# Patient Record
Sex: FEMALE | Race: WHITE | Employment: FULL TIME | URBAN - METROPOLITAN AREA
[De-identification: names, ages, dates, MRNs, and addresses within clinical notes are randomized per-mention and may not be internally consistent; named-entity substitution may affect disease eponyms.]

---

## 2019-01-27 ENCOUNTER — HOSPITAL ENCOUNTER (EMERGENCY)
Age: 39
Discharge: HOME | End: 2019-01-27
Attending: EMERGENCY MEDICINE
Payer: COMMERCIAL

## 2019-01-27 VITALS
HEIGHT: 65 IN | HEART RATE: 110 BPM | OXYGEN SATURATION: 100 % | SYSTOLIC BLOOD PRESSURE: 146 MMHG | WEIGHT: 150 LBS | TEMPERATURE: 98.1 F | RESPIRATION RATE: 20 BRPM | BODY MASS INDEX: 24.99 KG/M2 | DIASTOLIC BLOOD PRESSURE: 89 MMHG

## 2019-01-27 DIAGNOSIS — S61.211A LACERATION OF LEFT INDEX FINGER WITHOUT FOREIGN BODY WITHOUT DAMAGE TO NAIL, INITIAL ENCOUNTER: Primary | ICD-10-CM

## 2019-01-27 PROCEDURE — 99283 EMERGENCY DEPT VISIT LOW MDM: CPT

## 2019-01-27 NOTE — ED PROVIDER NOTES
EMERGENCY DEPARTMENT HISTORY AND PHYSICAL EXAM 
 
Date: 1/27/2019 Patient Name: Eyad Silvestre History of Presenting Illness Chief Complaint Patient presents with  Laceration History Provided By: Patient Chief Complaint: Laceration Duration: 1.5 Hours Timing:  Acute Location: Left index finger Severity: 4 out of 10 Associated Symptoms:  left index finger pain, bleeding from left index finger Additional History (Context):  
6:10 PM 
Eyad Silvestre is a 41 y.o. female with PMHX asthma presents to the emergency department C/O acute laceration to the left index finger (4/10) that won't stop bleeding onset 1.5 hours ago. Associated sxs include left index finger pain, bleeding from left index finger. Pt reports she had just begun to cut chicken with a clean knife when she accidentally sliced her left index finger. Tetanus UTD. NKDA. Pt denies use of blood thinners, fever, and any other sxs or complaints. PCP: [place], MD Nory 
 
Current Outpatient Medications Medication Sig Dispense Refill  fluticasone-salmeterol (ADVAIR DISKUS) 100-50 mcg/dose diskus inhaler Take 1 puff by inhalation every twelve (12) hours.  budesonide-formoterol (SYMBICORT) 160-4.5 mcg/actuation HFA inhaler Take 2 Puffs by inhalation two (2) times a day.  OTHER Allergy shots Past History Past Medical History: 
Past Medical History:  
Diagnosis Date  Asthma  Ill-defined condition   
 disc Past Surgical History: 
Past Surgical History:  
Procedure Laterality Date  HX LEEP PROCEDURE Family History: 
History reviewed. No pertinent family history. Social History: 
Social History Tobacco Use  Smoking status: Never Smoker  Smokeless tobacco: Never Used Substance Use Topics  Alcohol use: No  
 Drug use: No  
 
 
Allergies: 
No Known Allergies Review of Systems Review of Systems Constitutional: Negative for fever. Musculoskeletal: (+) Left index finger pain Skin: Positive for wound (laceration to left index finger). (+) Bleeding from left index finger All other systems reviewed and are negative. Physical Exam  
 
Vitals:  
 01/27/19 1800 BP: 146/89 Pulse: (!) 110 Resp: 20 Temp: 98.1 °F (36.7 °C) SpO2: 100% Weight: 68 kg (150 lb) Height: 5' 5\" (1.651 m) Physical Exam  
Constitutional: She is oriented to person, place, and time. She appears well-developed and well-nourished. No distress. HENT:  
Head: Normocephalic and atraumatic. Eyes: Conjunctivae and EOM are normal. Pupils are equal, round, and reactive to light. Neck: Normal range of motion. Neck supple. Musculoskeletal: Normal range of motion. oeft index finger with small superficial linear lac lateral nailbed, bleeding controlled, no fb or ttp, from Neurological: She is alert and oriented to person, place, and time. Skin: Skin is warm and dry. Psychiatric: She has a normal mood and affect. Her behavior is normal.  
Nursing note and vitals reviewed. Diagnostic Study Results Labs: 
 No results found for this or any previous visit (from the past 12 hour(s)). Radiologic Studies: No orders to display CT Results  (Last 48 hours) None CXR Results  (Last 48 hours) None Medical Decision Making I am the first provider for this patient. I reviewed the vital signs, available nursing notes, past medical history, past surgical history, family history and social history. Vital Signs: Reviewed the patient's vital signs. Pulse Oximetry Analysis: 100% on RA Records Reviewed: Nursing Notes and Old Medical Records Procedures: 
Procedures ED Course:  
6:10 PM Initial assessment performed. The patients presenting problems have been discussed, and they are in agreement with the care plan formulated and outlined with them.   I have encouraged them to ask questions as they arise throughout their visit. Discussion: Pt presents with small simple finger laceration left index finger. Wound is superficial & small & does NOT require repair. Wound was cleansed & dressed. Strict return precautions given, wound cleaning discussed, and  pt is offering no questions or concerns at this time. Diagnosis and Disposition DISCHARGE NOTE: 
6:33 PM  
Karmen Powell's  results have been reviewed with her. She has been counseled regarding her diagnosis, treatment, and plan. She verbally conveys understanding and agreement of the signs, symptoms, diagnosis, treatment and prognosis and additionally agrees to follow up as discussed. She also agrees with the care-plan and conveys that all of her questions have been answered. I have also provided discharge instructions for her that include: educational information regarding their diagnosis and treatment, and list of reasons why they would want to return to the ED prior to their follow-up appointment, should her condition change. She has been provided with education for proper emergency department utilization. CLINICAL IMPRESSION: 
 
1. Laceration of left index finger without foreign body without damage to nail, initial encounter PLAN: 
1. D/C Home 2. Current Discharge Medication List  
  
 
3. Follow-up Information Follow up With Specialties Details Why Contact Info  
 your doctor  Schedule an appointment as soon as possible for a visit [facility] EMERGENCY DEPT Emergency Medicine  As needed, If symptoms worsen [address] [place] [address] 
[phone] [facility]    [address], [address] [place] [address] 
[phone]  
  
 
___________________________ Attestations:  
 
SCRIBE ATTESTATION: 
This note is prepared by Tadeo Francois, acting as Scribe for Serge Denis PA-C. 
 
PROVIDER ATTESTATION: 
 Fede Hope PA-C: The scribe's documentation has been prepared under my direction and personally reviewed by me in its entirety. I confirm that the note above accurately reflects all work, treatment, procedures, and medical decision making performed by me.  
___________________________

## 2019-01-27 NOTE — DISCHARGE INSTRUCTIONS
Patient Education        Cuts Left Open: Care Instructions  Your Care Instructions    A cut can happen anywhere on your body. Sometimes a cut can injure the tendons, blood vessels, or nerves. A cut may be left open instead of being closed with stitches, staples, or adhesive. A cut may be left open when it is likely to become infected, because closing it can make infection even more likely. You will probably have a bandage. The doctor may want the cut to stay open the whole time it heals. This happens with some cuts when too much time has gone by since the cut happened. Or the doctor may tell you to come back to have the cut closed in 4 to 5 days, when there is less chance of infection. If the cut stays open while healing, your scar may be larger than if the cut was closed. But you can get treatment later to make the scar smaller. The doctor has checked you carefully, but problems can develop later. If you notice any problems or new symptoms, get medical treatment right away. Follow-up care is a key part of your treatment and safety. Be sure to make and go to all appointments, and call your doctor if you are having problems. It's also a good idea to know your test results and keep a list of the medicines you take. How can you care for yourself at home? · Keep the cut dry for the first 24 to 48 hours. After this, you can shower if your doctor okays it. Pat the cut dry. · Don't soak the cut, such as in a bathtub. Your doctor will tell you when it's safe to get the cut wet. · If your doctor told you how to care for your cut, follow your doctor's instructions. If you did not get instructions, follow this general advice:  ? After the first 24 to 48 hours, wash the cut with clean water 2 times a day. Don't use hydrogen peroxide or alcohol, which can slow healing. ? You may cover the cut with a thin layer of petroleum jelly, such as Vaseline, and a nonstick bandage. ?  Apply more petroleum jelly and replace the bandage as needed. · Prop up the injured area on a pillow anytime you sit or lie down during the next 3 days. Try to keep it above the level of your heart. This will help reduce swelling. · Avoid any activity that could cause your cut to get worse. · Take pain medicines exactly as directed. ? If the doctor gave you a prescription medicine for pain, take it as prescribed. ? If you are not taking a prescription pain medicine, ask your doctor if you can take an over-the-counter medicine. When should you call for help? Call your doctor now or seek immediate medical care if:    · You have new pain, or your pain gets worse.     · The cut starts to bleed, and blood soaks through the bandage. Oozing small amounts of blood is normal.     · The skin near the cut is cold or pale or changes color.     · You have tingling, weakness, or numbness near the cut.     · You have trouble moving the area near the cut.     · You have symptoms of infection, such as:  ? Increased pain, swelling, warmth, or redness around the cut.  ? Red streaks leading from the cut.  ? Pus draining from the cut.  ? A fever.    Watch closely for changes in your health, and be sure to contact your doctor if:    · The cut is not closing (getting smaller).     · You do not get better as expected. Where can you learn more? Go to http://kody-edgardo.info/. Enter 20-23-41-52 in the search box to learn more about \"Cuts Left Open: Care Instructions. \"  Current as of: September 23, 2018  Content Version: 11.9  © 2324-5063 [employer]. Care instructions adapted under license by [employer] (which disclaims liability or warranty for this information). If you have questions about a medical condition or this instruction, always ask your healthcare professional. [place] [address] any warranty or liability for your use of this information.

## 2020-01-31 ENCOUNTER — HOSPITAL ENCOUNTER (EMERGENCY)
Age: 40
Discharge: HOME | End: 2020-02-01
Attending: EMERGENCY MEDICINE
Payer: COMMERCIAL

## 2020-01-31 DIAGNOSIS — G43.909 MIGRAINE WITHOUT STATUS MIGRAINOSUS, NOT INTRACTABLE, UNSPECIFIED MIGRAINE TYPE: Primary | ICD-10-CM

## 2020-01-31 DIAGNOSIS — J01.90 ACUTE SINUSITIS, RECURRENCE NOT SPECIFIED, UNSPECIFIED LOCATION: ICD-10-CM

## 2020-01-31 PROCEDURE — 96374 THER/PROPH/DIAG INJ IV PUSH: CPT

## 2020-01-31 PROCEDURE — 99283 EMERGENCY DEPT VISIT LOW MDM: CPT

## 2020-01-31 PROCEDURE — 74011250636 HC RX REV CODE- 250/636: Performed by: PHYSICIAN ASSISTANT

## 2020-01-31 RX ORDER — KETOROLAC TROMETHAMINE 15 MG/ML
15 INJECTION, SOLUTION INTRAMUSCULAR; INTRAVENOUS
Status: COMPLETED | OUTPATIENT
Start: 2020-01-31 | End: 2020-02-01

## 2020-01-31 RX ORDER — ONDANSETRON 2 MG/ML
4 INJECTION INTRAMUSCULAR; INTRAVENOUS
Status: COMPLETED | OUTPATIENT
Start: 2020-01-31 | End: 2020-01-31

## 2020-01-31 RX ADMIN — ONDANSETRON 4 MG: 2 INJECTION INTRAMUSCULAR; INTRAVENOUS at 23:59

## 2020-02-01 VITALS
SYSTOLIC BLOOD PRESSURE: 103 MMHG | BODY MASS INDEX: 27.49 KG/M2 | RESPIRATION RATE: 16 BRPM | HEART RATE: 87 BPM | TEMPERATURE: 97.6 F | OXYGEN SATURATION: 100 % | WEIGHT: 165 LBS | DIASTOLIC BLOOD PRESSURE: 63 MMHG | HEIGHT: 65 IN

## 2020-02-01 LAB
ANION GAP SERPL CALC-SCNC: 8 MMOL/L (ref 3–18)
BASOPHILS # BLD: 0 K/UL (ref 0–0.1)
BASOPHILS NFR BLD: 0 % (ref 0–2)
BUN SERPL-MCNC: 13 MG/DL (ref 7–18)
BUN/CREAT SERPL: 17 (ref 12–20)
CALCIUM SERPL-MCNC: 9.2 MG/DL (ref 8.5–10.1)
CHLORIDE SERPL-SCNC: 107 MMOL/L (ref 100–111)
CO2 SERPL-SCNC: 25 MMOL/L (ref 21–32)
CREAT SERPL-MCNC: 0.77 MG/DL (ref 0.6–1.3)
DIFFERENTIAL METHOD BLD: ABNORMAL
EOSINOPHIL # BLD: 0.1 K/UL (ref 0–0.4)
EOSINOPHIL NFR BLD: 1 % (ref 0–5)
ERYTHROCYTE [DISTWIDTH] IN BLOOD BY AUTOMATED COUNT: 12 % (ref 11.6–14.5)
GLUCOSE SERPL-MCNC: 135 MG/DL (ref 74–99)
HCG SERPL QL: NEGATIVE
HCT VFR BLD AUTO: 37.2 % (ref 35–45)
HGB BLD-MCNC: 12.6 G/DL (ref 12–16)
LYMPHOCYTES # BLD: 2 K/UL (ref 0.9–3.6)
LYMPHOCYTES NFR BLD: 20 % (ref 21–52)
MCH RBC QN AUTO: 29.6 PG (ref 24–34)
MCHC RBC AUTO-ENTMCNC: 33.9 G/DL (ref 31–37)
MCV RBC AUTO: 87.3 FL (ref 74–97)
MONOCYTES # BLD: 0.5 K/UL (ref 0.05–1.2)
MONOCYTES NFR BLD: 5 % (ref 3–10)
NEUTS SEG # BLD: 7.3 K/UL (ref 1.8–8)
NEUTS SEG NFR BLD: 74 % (ref 40–73)
PLATELET # BLD AUTO: 302 K/UL (ref 135–420)
PMV BLD AUTO: 9 FL (ref 9.2–11.8)
POTASSIUM SERPL-SCNC: 3.9 MMOL/L (ref 3.5–5.5)
RBC # BLD AUTO: 4.26 M/UL (ref 4.2–5.3)
SODIUM SERPL-SCNC: 140 MMOL/L (ref 136–145)
WBC # BLD AUTO: 9.8 K/UL (ref 4.6–13.2)

## 2020-02-01 PROCEDURE — 85025 COMPLETE CBC W/AUTO DIFF WBC: CPT

## 2020-02-01 PROCEDURE — 96375 TX/PRO/DX INJ NEW DRUG ADDON: CPT

## 2020-02-01 PROCEDURE — 74011250636 HC RX REV CODE- 250/636: Performed by: PHYSICIAN ASSISTANT

## 2020-02-01 PROCEDURE — 74011000258 HC RX REV CODE- 258: Performed by: PHYSICIAN ASSISTANT

## 2020-02-01 PROCEDURE — 96361 HYDRATE IV INFUSION ADD-ON: CPT

## 2020-02-01 PROCEDURE — 84703 CHORIONIC GONADOTROPIN ASSAY: CPT

## 2020-02-01 PROCEDURE — 80048 BASIC METABOLIC PNL TOTAL CA: CPT

## 2020-02-01 RX ORDER — TRIAMCINOLONE ACETONIDE 55 UG/1
2 SPRAY, METERED NASAL
COMMUNITY

## 2020-02-01 RX ORDER — AMOXICILLIN AND CLAVULANATE POTASSIUM 875; 125 MG/1; MG/1
TABLET, FILM COATED ORAL
COMMUNITY
Start: 2020-01-30

## 2020-02-01 RX ORDER — PREDNISONE 10 MG/1
TABLET ORAL
COMMUNITY
Start: 2019-12-20

## 2020-02-01 RX ORDER — MONTELUKAST SODIUM 10 MG/1
10 TABLET ORAL
COMMUNITY
Start: 2016-12-20

## 2020-02-01 RX ORDER — TOPIRAMATE 25 MG/1
25 CAPSULE, COATED PELLETS ORAL
COMMUNITY

## 2020-02-01 RX ORDER — BUDESONIDE AND FORMOTEROL FUMARATE DIHYDRATE 80; 4.5 UG/1; UG/1
2 AEROSOL RESPIRATORY (INHALATION)
COMMUNITY

## 2020-02-01 RX ORDER — ALBUTEROL SULFATE 0.83 MG/ML
2.5 SOLUTION RESPIRATORY (INHALATION)
COMMUNITY
Start: 2019-12-20

## 2020-02-01 RX ORDER — ONDANSETRON 4 MG/1
4 TABLET, ORALLY DISINTEGRATING ORAL
COMMUNITY
Start: 2019-12-03

## 2020-02-01 RX ORDER — LEVOCETIRIZINE DIHYDROCHLORIDE 5 MG/1
5 TABLET, FILM COATED ORAL
COMMUNITY

## 2020-02-01 RX ORDER — IBUPROFEN 200 MG
600 TABLET ORAL
COMMUNITY

## 2020-02-01 RX ORDER — ALBUTEROL SULFATE 90 UG/1
AEROSOL, METERED RESPIRATORY (INHALATION)
COMMUNITY
Start: 2020-01-30

## 2020-02-01 RX ADMIN — SODIUM CHLORIDE 1000 ML: 900 INJECTION, SOLUTION INTRAVENOUS at 00:01

## 2020-02-01 RX ADMIN — SODIUM CHLORIDE 500 ML: 900 INJECTION, SOLUTION INTRAVENOUS at 01:08

## 2020-02-01 RX ADMIN — PROMETHAZINE HYDROCHLORIDE 12.5 MG: 25 INJECTION, SOLUTION INTRAMUSCULAR; INTRAVENOUS at 01:25

## 2020-02-01 RX ADMIN — KETOROLAC TROMETHAMINE 15 MG: 15 INJECTION, SOLUTION INTRAMUSCULAR; INTRAVENOUS at 00:00

## 2020-02-01 NOTE — ED PROVIDER NOTES
EMERGENCY DEPARTMENT HISTORY AND PHYSICAL EXAM    Date: 1/31/2020  Patient Name: Soraya Zaragoza    History of Presenting Illness     Chief Complaint   Patient presents with    Migraine         History Provided By: Patient    11:29 PM  Soraya Zaragoza is a 42 y.o. female with PMHX of migraines and asthma who presents to the emergency department C/O left-sided headache, nausea vomiting onset today. Patient reports 1 week of nasal congestion sore throat [facility] which [facility], had no testing and was prescribed Augmentin for sinusitis. Patient is also being worked up for various musculoskeletal complaints to include work-up for autoimmune disorder as well as infertility. Earlier this month she was placed on Topamax and states migraines have become less frequent. Her typical migraine consists of headache and nausea and vomiting and light sensitivity. Pt denies fever, persistent sore throat, ear pain, diarrhea, abdominal pain, and any other sxs or complaints. PCP: Nory Reid MD    Current Facility-Administered Medications   Medication Dose Route Frequency Provider Last Rate Last Dose    sodium chloride 0.9 % bolus infusion 500 mL  500 mL IntraVENous ONCE Rudi Esquivel 500 mL/hr at 02/01/20 0108 500 mL at 02/01/20 0108     Current Outpatient Medications   Medication Sig Dispense Refill    albuterol (PROVENTIL VENTOLIN) 2.5 mg /3 mL (0.083 %) nebu Take 2.5 mg by inhalation.  montelukast (SINGULAIR) 10 mg tablet Take 10 mg by mouth.  ondansetron (ZOFRAN ODT) 4 mg disintegrating tablet Take 4 mg by mouth.  PROAIR HFA 90 mcg/actuation inhaler       amoxicillin-clavulanate (AUGMENTIN) 875-125 mg per tablet       Cetirizine (ZYRTEC) 10 mg cap Take  by mouth.  ibuprofen (MOTRIN) 200 mg tablet Take 600 mg by mouth.  levocetirizine (XYZAL) 5 mg tablet Take 5 mg by mouth.       predniSONE (DELTASONE) 10 mg tablet       topiramate (TOPAMAX) 25 mg sprinkle capsule Take 25 mg by mouth.  triamcinolone (NASACORT AQ) 55 mcg nasal inhaler 2 Sprays by Nasal route.  budesonide-formoteroL (SYMBICORT) 80-4.5 mcg/actuation HFAA Take 2 Puffs by inhalation.  fluticasone-salmeterol (ADVAIR DISKUS) 100-50 mcg/dose diskus inhaler Take 1 puff by inhalation every twelve (12) hours. Past History     Past Medical History:  Past Medical History:   Diagnosis Date    Asthma     Ill-defined condition     disc       Past Surgical History:  Past Surgical History:   Procedure Laterality Date    HX LEEP PROCEDURE         Family History:  History reviewed. No pertinent family history. Social History:  Social History     Tobacco Use    Smoking status: Never Smoker    Smokeless tobacco: Never Used   Substance Use Topics    Alcohol use: No    Drug use: No       Allergies:  No Known Allergies      Review of Systems   Review of Systems   Constitutional: Negative. Negative for fever. HENT: Positive for congestion and sore throat (resolved). Eyes: Positive for photophobia. Respiratory: Negative for cough. Gastrointestinal: Positive for nausea and vomiting. Negative for abdominal pain and diarrhea. Neurological: Positive for headaches. All other systems reviewed and are negative. Physical Exam     Vitals:    01/31/20 2134   BP: (!) 134/95   Pulse: (!) 112   Resp: 18   Temp: 97.6 °F (36.4 °C)   SpO2: 100%   Weight: 74.8 kg (165 lb)   Height: 5' 5\" (1.651 m)     Physical Exam  Vital signs and nursing notes reviewed. CONSTITUTIONAL: Alert. Somewhat ill but not toxic appearing, well-nourished. Appears uncomfortable but not in distress. HEAD: Normocephalic; atraumatic. EYES: PERRL; EOM's intact. No nystagmus. Conjunctiva clear. ENT: TM's normal. External ear normal. Normal nose; no rhinorrhea. Normal pharynx. Tonsils not enlarged without exudate. Moist mucus membranes.   NECK: Supple; FROM without difficulty, non-tender; no cervical lymphadenopathy. CV: Normal S1, S2; no murmurs, rubs, or gallops. No chest wall tenderness. RESPIRATORY: Normal chest excursion with respiration; breath sounds clear and equal bilaterally; no wheezes, rhonchi, or rales. GI: Normal bowel sounds; non-distended; non-tender; no guarding or rigidity; no palpable organomegaly. No CVA tenderness. SKIN: Normal for age and race; warm; dry; good turgor; no apparent lesions or exudate. NEURO: A & O x3. Cranial nerves 2-12 intact. Motor 5/5 bilaterally. Sensation intact. PSYCH:  Mood and affect appropriate. Diagnostic Study Results     Labs -     Recent Results (from the past 12 hour(s))   CBC WITH AUTOMATED DIFF    Collection Time: 02/01/20 12:05 AM   Result Value Ref Range    WBC 9.8 4.6 - 13.2 K/uL    RBC 4.26 4.20 - 5.30 M/uL    HGB 12.6 12.0 - 16.0 g/dL    HCT 37.2 35.0 - 45.0 %    MCV 87.3 74.0 - 97.0 FL    MCH 29.6 24.0 - 34.0 PG    MCHC 33.9 31.0 - 37.0 g/dL    RDW 12.0 11.6 - 14.5 %    PLATELET 433 006 - 789 K/uL    MPV 9.0 (L) 9.2 - 11.8 FL    NEUTROPHILS 74 (H) 40 - 73 %    LYMPHOCYTES 20 (L) 21 - 52 %    MONOCYTES 5 3 - 10 %    EOSINOPHILS 1 0 - 5 %    BASOPHILS 0 0 - 2 %    ABS. NEUTROPHILS 7.3 1.8 - 8.0 K/UL    ABS. LYMPHOCYTES 2.0 0.9 - 3.6 K/UL    ABS. MONOCYTES 0.5 0.05 - 1.2 K/UL    ABS. EOSINOPHILS 0.1 0.0 - 0.4 K/UL    ABS.  BASOPHILS 0.0 0.0 - 0.1 K/UL    DF AUTOMATED     METABOLIC PANEL, BASIC    Collection Time: 02/01/20 12:05 AM   Result Value Ref Range    Sodium 140 136 - 145 mmol/L    Potassium 3.9 3.5 - 5.5 mmol/L    Chloride 107 100 - 111 mmol/L    CO2 25 21 - 32 mmol/L    Anion gap 8 3.0 - 18 mmol/L    Glucose 135 (H) 74 - 99 mg/dL    BUN 13 7.0 - 18 MG/DL    Creatinine 0.77 0.6 - 1.3 MG/DL    BUN/Creatinine ratio 17 12 - 20      GFR est AA >60 >60 ml/min/1.73m2    GFR est non-AA >60 >60 ml/min/1.73m2    Calcium 9.2 8.5 - 10.1 MG/DL   HCG QL SERUM    Collection Time: 02/01/20 12:05 AM   Result Value Ref Range    HCG, Ql. NEGATIVE  NEG         Radiologic Studies -   No orders to display     CT Results  (Last 48 hours)    None        CXR Results  (Last 48 hours)    None          Medications given in the ED-  Medications   sodium chloride 0.9 % bolus infusion 500 mL (500 mL IntraVENous New Bag 2/1/20 0108)   sodium chloride 0.9 % bolus infusion 1,000 mL (0 mL IntraVENous IV Completed 2/1/20 0100)   ondansetron (ZOFRAN) injection 4 mg (4 mg IntraVENous Given 1/31/20 2359)   ketorolac (TORADOL) injection 15 mg (15 mg IntraVENous Given 2/1/20 0000)   promethazine (PHENERGAN) 12.5 mg in 0.9% sodium chloride 50 mL IVPB (12.5 mg IntraVENous New Bag 2/1/20 0125)         Medical Decision Making   I am the first provider for this patient. I reviewed the vital signs, available nursing notes, past medical history, past surgical history, family history and social history. Vital Signs-Reviewed the patient's vital signs. Records Reviewed: Nursing Notes      Procedures:  Procedures    ED Course:  11:29 PM   Initial assessment performed. The patients presenting problems have been discussed, and they are in agreement with the care plan formulated and outlined with them. I have encouraged them to ask questions as they arise throughout their visit. 12:53 AM progress note  Patient overall feeling much better. States her headache is gone but still feeling nauseous. Will give Phenergan and continue IV fluids and if improving and tolerating p.o. plan to discharge home. Repeat vital stable. Labs unremarkable. History and physical is consistent with migraine in addition to recent diagnosis of sinusitis. Neurologic exam is normal.  Is being worked up by her PCP for chronic fatigue and various complaints of pain. She states she has a referral to neurology but it is not until June so I will make additional neuro recommendation. Diagnosis and Disposition       DISCHARGE NOTE:    Latosha Ramírez  results have been reviewed with her.   She has been counseled regarding her diagnosis, treatment, and plan. She verbally conveys understanding and agreement of the signs, symptoms, diagnosis, treatment and prognosis and additionally agrees to follow up as discussed. She also agrees with the care-plan and conveys that all of her questions have been answered. I have also provided discharge instructions for her that include: educational information regarding their diagnosis and treatment, and list of reasons why they would want to return to the ED prior to their follow-up appointment, should her condition change. She has been provided with education for proper emergency department utilization. CLINICAL IMPRESSION:    1. Migraine without status migrainosus, not intractable, unspecified migraine type    2. Acute sinusitis, recurrence not specified, unspecified location        PLAN:  1. D/C Home  2. Current Discharge Medication List        3. Follow-up Information     Follow up With Specialties Details Why Contact Info    Dusty Romo MD Internal Medicine Schedule an appointment as soon as possible for a visit  [facility] [phone]      Warren Levy MD Neurology Schedule an appointment as soon as possible for a visit  [address] [place] [address]  [phone]      [facility] EMERGENCY DEPT Emergency Medicine  As needed, If symptoms worsen [address] [place] [id number] [phone]        _______________________________      Please note that this dictation was completed with [employer], the computer voice recognition software. Quite often unanticipated grammatical, syntax, homophones, and other interpretive errors are inadvertently transcribed by the computer software. Please disregard these errors. Please excuse any errors that have escaped final proofreading.

## 2020-02-01 NOTE — DISCHARGE INSTRUCTIONS
Patient Education        Migraine Headache: Care Instructions  Your Care Instructions  Migraines are painful, throbbing headaches that often start on one side of the head. They may cause nausea and vomiting and make you sensitive to light, sound, or smell. Without treatment, migraines can last from 4 hours to a few days. Medicines can help prevent migraines or stop them after they have started. Your doctor can help you find which ones work best for you. Follow-up care is a key part of your treatment and safety. Be sure to make and go to all appointments, and call your doctor if you are having problems. It's also a good idea to know your test results and keep a list of the medicines you take. How can you care for yourself at home? · Do not drive if you have taken a prescription pain medicine. · Rest in a quiet, dark room until your headache is gone. Close your eyes, and try to relax or go to sleep. Don't watch TV or read. · Put a cold, moist cloth or cold pack on the painful area for 10 to 20 minutes at a time. Put a thin cloth between the cold pack and your skin. · Use a warm, moist towel or a heating pad set on low to relax tight shoulder and neck muscles. · Have someone gently massage your neck and shoulders. · Take your medicines exactly as prescribed. Call your doctor if you think you are having a problem with your medicine. You will get more details on the specific medicines your doctor prescribes. · Be careful not to take pain medicine more often than the instructions allow. You could get worse or more frequent headaches when the medicine wears off. To prevent migraines  · Keep a headache diary so you can figure out what triggers your headaches. Avoiding triggers may help you prevent headaches. Record when each headache began, how long it lasted, and what the pain was like.  (Was it throbbing, aching, stabbing, or dull?) Write down any other symptoms you had with the headache, such as nausea, flashing lights or dark spots, or sensitivity to bright light or loud noise. Note if the headache occurred near your period. List anything that might have triggered the headache. Triggers may include certain foods (chocolate, cheese, wine) or odors, smoke, bright light, stress, or lack of sleep. · If your doctor has prescribed medicine for your migraines, take it as directed. You may have medicine that you take only when you get a migraine and medicine that you take all the time to help prevent migraines. ? If your doctor has prescribed medicine for when you get a headache, take it at the first sign of a migraine, unless your doctor has given you other instructions. ? If your doctor has prescribed medicine to prevent migraines, take it exactly as prescribed. Call your doctor if you think you are having a problem with your medicine. · Find healthy ways to deal with stress. Migraines are most common during or right after stressful times. Take time to relax before and after you do something that has caused a migraine in the past.  · Try to keep your muscles relaxed by keeping good posture. Check your jaw, face, neck, and shoulder muscles for tension. Try to relax them. When you sit at a desk, change positions often. And make sure to stretch for 30 seconds each hour. · Get plenty of sleep and exercise. · Eat meals on a regular schedule. Avoid foods and drinks that often trigger migraines. These include chocolate, alcohol (especially red wine and port), aspartame, monosodium glutamate (MSG), and some additives found in foods (such as hot dogs, mejia, cold cuts, aged cheeses, and pickled foods). · Limit caffeine. Don't drink too much coffee, tea, or soda. But don't quit caffeine suddenly. That can also give you migraines. · Do not smoke or allow others to smoke around you. If you need help quitting, talk to your doctor about stop-smoking programs and medicines.  These can increase your chances of quitting for good.  · If you are taking birth control pills or hormone therapy, talk to your doctor about whether they are triggering your migraines. When should you call for help? Call 911 anytime you think you may need emergency care. For example, call if:    · You have signs of a stroke. These may include:  ? Sudden numbness, paralysis, or weakness in your face, arm, or leg, especially on only one side of your body. ? Sudden vision changes. ? Sudden trouble speaking. ? Sudden confusion or trouble understanding simple statements. ? Sudden problems with walking or balance. ? A sudden, severe headache that is different from past headaches.    Call your doctor now or seek immediate medical care if:    · You have new or worse nausea and vomiting.     · You have a new or higher fever.     · Your headache gets much worse.    Watch closely for changes in your health, and be sure to contact your doctor if:    · You are not getting better after 2 days (48 hours). Where can you learn more? Go to http://kody-edgardo.info/. Enter C802 in the search box to learn more about \"Migraine Headache: Care Instructions. \"  Current as of: March 28, 2019  Content Version: 12.2  © 0112-7565 [employer] Care instructions adapted under license by [employer] (which disclaims liability or warranty for this information). If you have questions about a medical condition or this instruction, always ask your healthcare professional. [place] [address] any warranty or liability for your use of this information. Patient Education        Sinusitis: Care Instructions  Your Care Instructions    Sinusitis is an infection of the lining of the sinus cavities in your head. Sinusitis often follows a cold. It causes pain and pressure in your head and face. In most cases, sinusitis gets better on its own in 1 to 2 weeks. But some mild symptoms may last for several weeks.  Sometimes antibiotics are needed. Follow-up care is a key part of your treatment and safety. Be sure to make and go to all appointments, and call your doctor if you are having problems. It's also a good idea to know your test results and keep a list of the medicines you take. How can you care for yourself at home? · Take an over-the-counter pain medicine, such as acetaminophen (Tylenol), ibuprofen (Advil, Motrin), or naproxen (Aleve). Read and follow all instructions on the label. · If the doctor prescribed antibiotics, take them as directed. Do not stop taking them just because you feel better. You need to take the full course of antibiotics. · Be careful when taking over-the-counter cold or flu medicines and Tylenol at the same time. Many of these medicines have acetaminophen, which is Tylenol. Read the labels to make sure that you are not taking more than the recommended dose. Too much acetaminophen (Tylenol) can be harmful. · Breathe warm, moist air from a steamy shower, a hot bath, or a sink filled with hot water. Avoid cold, dry air. Using a humidifier in your home may help. Follow the directions for cleaning the machine. · Use saline (saltwater) nasal washes to help keep your nasal passages open and wash out mucus and bacteria. You can buy saline nose drops at a grocery store or drugstore. Or you can make your own at home by adding 1 teaspoon of salt and 1 teaspoon of baking soda to 2 cups of distilled water. If you make your own, fill a bulb syringe with the solution, insert the tip into your nostril, and squeeze gently. [place] your nose. · Put a hot, wet towel or a warm gel pack on your face 3 or 4 times a day for 5 to 10 minutes each time. · Try a decongestant nasal spray like oxymetazoline (Afrin). Do not use it for more than 3 days in a row. Using it for more than 3 days can make your congestion worse. When should you call for help?   Call your doctor now or seek immediate medical care if:    · You have new or worse swelling or redness in your face or around your eyes.     · You have a new or higher fever.    Watch closely for changes in your health, and be sure to contact your doctor if:    · You have new or worse facial pain.     · The mucus from your nose becomes thicker (like pus) or has new blood in it.     · You are not getting better as expected. Where can you learn more? Go to http://kody-edgardo.info/. Enter B831 in the search box to learn more about \"Sinusitis: Care Instructions. \"  Current as of: October 21, 2018  Content Version: 12.2  © 7072-9827 [employer]. Care instructions adapted under license by [employer] (which disclaims liability or warranty for this information). If you have questions about a medical condition or this instruction, always ask your healthcare professional. [address] any warranty or liability for your use of this information.

## 2020-02-01 NOTE — ED TRIAGE NOTES
Pt c/o left side h/a that started today;  H/o migraines;  Pt c/o n/v; pt currently being tx for a sinus infection